# Patient Record
Sex: MALE | Race: WHITE | Employment: FULL TIME | URBAN - METROPOLITAN AREA
[De-identification: names, ages, dates, MRNs, and addresses within clinical notes are randomized per-mention and may not be internally consistent; named-entity substitution may affect disease eponyms.]

---

## 2020-11-16 ENCOUNTER — VIRTUAL VISIT (OUTPATIENT)
Dept: URGENT CARE | Facility: CLINIC | Age: 30
End: 2020-11-16

## 2020-11-16 DIAGNOSIS — Z20.822 EXPOSURE TO COVID-19 VIRUS: Primary | ICD-10-CM

## 2020-11-16 DIAGNOSIS — Z84.89 FAMILY HISTORY OF GENETIC DISEASE: ICD-10-CM

## 2020-11-16 PROCEDURE — 99202 OFFICE O/P NEW SF 15 MIN: CPT | Mod: 95

## 2020-11-16 NOTE — PROGRESS NOTES
"Jerome Dickey is a 30 year old male who is being evaluated via a billable telephone visit.      The patient has been notified of following:     \"This telephone visit will be conducted via a call between you and your physician/provider. We have found that certain health care needs can be provided without the need for a physical exam.  This service lets us provide the care you need with a short phone conversation.  If a prescription is necessary we can send it directly to your pharmacy.  If lab work is needed we can place an order for that and you can then stop by our lab to have the test done at a later time.    Telephone visits are billed at different rates depending on your insurance coverage. During this emergency period, for some insurers they may be billed the same as an in-person visit.  Please reach out to your insurance provider with any questions.    If during the course of the call the physician/provider feels a telephone visit is not appropriate, you will not be charged for this service.\"    Patient has given verbal consent for Telephone visit?  Yes    What phone number would you like to be contacted at? [phone]    How would you like to obtain your AVS? Mail a copy    Subjective   Jerome Dickey is a 30 year old male who presents via phone visit today for the following health issues:  HPI  Covid19 testing  Acute illness concerns:  Was exposed to his sister who tested positive for covid19 6days ago.  He is primarily asymptomatic at this time.  Onset/Duration: 6days ago  Symptoms:  Fever: no  Chills/Sweats: no  Headache (location?): no  Sinus Pressure: no  Conjunctivitis:  no  Ear Pain: no  Rhinorrhea: no  Congestion: no  Sore Throat: no  Cough: no  Wheeze: no  Decreased Appetite: no  Nausea: no  Vomiting: no  Diarrhea: no  Dysuria/Freq.: no  Dysuria or Hematuria: no  Fatigue/Achiness: no  Sick/Strep Exposure: YES- exposure to covid19 from his sister  Therapies tried and outcome: None  There is no " problem list on file for this patient.    No current outpatient medications on file.     No current facility-administered medications for this visit.       No Known Allergies  Review of Systems   CONSTITUTIONAL: NEGATIVE for fever, chills, change in weight  INTEGUMENTARY/SKIN: NEGATIVE for worrisome rashes, moles or lesions  EYES: NEGATIVE for vision changes or irritation  CV: NEGATIVE for chest pain, palpitations or peripheral edema  GI: NEGATIVE for nausea, abdominal pain, heartburn, or change in bowel habits  MUSCULOSKELETAL: NEGATIVE for significant arthralgias or myalgia  NEURO: NEGATIVE for weakness, dizziness or paresthesias     Objective      Vitals:  No vitals were obtained today due to virtual visit.  healthy, alert, no distress and cooperative  PSYCH: Alert and oriented times 3; coherent speech, normal   rate and volume, able to articulate logical thoughts, able   to abstract reason, no tangential thoughts, no hallucinations   or delusions  His affect is normal and pleasant  RESP: No cough, no audible wheezing, able to talk in full sentences  Remainder of exam unable to be completed due to telephone visits      Assessment & Plan   Exposure to COVID-19 virus:  Will send for COVID19 testing due to exposure from close contacts.  Recommend testing 5-7days after exposure to reduce risk of false negative results.  S/he is currently asymptomatic at this time.  Tylenol as needed for pain/fever, rest, fluids, chicken soup.  Recommend self quarantine until it has been at least 14days since onset of symptoms with improvement and until s/he has been fever free for 3days without the use of anti-pyretics.  To the ER if worsening cough, shortness of breath, wheezing, fevers or chest pain.  -     Asymptomatic COVID-19 Virus (Coronavirus) by PCR; Future            Huong See QUE Neumann  [facility]    Phone call duration:  5 minutes

## 2021-05-28 ENCOUNTER — RECORDS - HEALTHEAST (OUTPATIENT)
Dept: ADMINISTRATIVE | Facility: CLINIC | Age: 31
End: 2021-05-28

## 2022-01-13 ENCOUNTER — MEDICAL CORRESPONDENCE (OUTPATIENT)
Dept: HEALTH INFORMATION MANAGEMENT | Facility: CLINIC | Age: 32
End: 2022-01-13

## 2022-01-13 PROCEDURE — 99000 SPECIMEN HANDLING OFFICE-LAB: CPT | Performed by: PHYSICIAN ASSISTANT

## 2022-01-17 DIAGNOSIS — Z84.89 FAMILY HISTORY OF GENETIC DISEASE: Primary | ICD-10-CM

## 2022-01-17 NOTE — PROGRESS NOTES
Name: Jerome Dickey   : 1990  MRN: [id number]  Date of Service: 2022  PCP: No primary care provider on file.    Presenting information:   Jerome is a 31 year old male who was seen to consent for exome sequencing to aid in interpretation of his child's sample.  I discussed the testing with Jerome in person at his child's appointment.     Family History: A three generation pedigree was obtained at Jerome's child's visit and scanned into the EMR.        Discussion: Jerome consented to provide samples to aid in interpretation of the proband's exome sequencing. This test can identify familial relationships. The potential results were discussed including a positive, negative, or variant of uncertain significance. Familial samples would be used to determine how it was inherited, if at all. A report will not be issued for Jerome separate from proband's report. Communication of results is directly to proband's medical decision-makers. They may choose to share with information with family members. If the change is believed to be pathogenic, it can alter clinical management for Jerome and provide recurrence risk information. Testing other family members or pregnancies can be considered. Communication of this information depends on the medical decision-makers for proband.     Jerome understood the purpose of the testing.     [facility] Secondary Findings  We reviewed that the lab can report the results of gene mutations that are found in genes recommended by the [employer] ([facility]) to be reported to ES patients even if the gene variant does not contribute to their current symptoms.  Many of these gene changes may not be associated with symptoms until adulthood and are not traditionally tested for in children, but may lead to medical management changes. Examples include genes related to increased cancer risk, heart conditions, or metabolic conditions. In addition, relative status for a  "change in one of the secondary findings genes may sought from Jerome's results.      We discussed that there are insurance implications related to these findings in terms of life, short term disability, and long term disability insurance. There is a federal law in place at the moment, The Genetic Information Nondiscrimination Act or TABITHA (2008) that protects again health insurance discrimination.  Health insurance protections do not apply to members of the [place]  who receive care through [employer], Veterans receiving care through the VA, the [facility], or federal employees who receive care through Federal Employees Health Benefits Plan. Employers may not discriminate (hiring, firing, promotions etc.), based on genetic information. This only applies to companies with 15 or more employees. It does not apply to federal employees, or , which have their own nondiscrimination protections in place. Employers may have \"voluntary\" health services such as employee wellness programs that request genetic information or family history, which is not a violation of TABITHA.     At this time, the family decided to accepted the results from the [facility] secondary findings. This will be reported for proband directly and communication of this information is dependent on proband's medical decision-makers.    Consent/Insurance Coverage for ES  Jerome provided informed consent for the testing, signed with verbal consent (COVID-19). They will not be receiving individual reports from this test. All samples must be received within three weeks of each other. Additional questions or concerns were denied.       PLAN  1. The purpose and process of exome sequencing (ES) was reviewed today.  2. After reviewing the risks, benefits, and limitations of genetic testing, consent was obtained for exome sequencing for Jerome via a buccal sample.  Orders Placed This Encounter   Procedures     Other Laboratory; [facility]; Exome sequencing trio " familial sample (561d). DO NOT BILL PATIENT (Laboratory Miscellaneous Order)       3. Jerome will not receive a report for this test.  4. Contact information was provided to the family.  Additional questions or concerns were denied.      [facility]  Genetic Counselor   [facility]   Phone: [phone]  Pager: [phone]  Email: [email or web address]      CC: Patient